# Patient Record
Sex: MALE | URBAN - METROPOLITAN AREA
[De-identification: names, ages, dates, MRNs, and addresses within clinical notes are randomized per-mention and may not be internally consistent; named-entity substitution may affect disease eponyms.]

---

## 2020-07-14 ENCOUNTER — OFFICE VISIT (OUTPATIENT)
Dept: URBAN - METROPOLITAN AREA CLINIC 17 | Facility: CLINIC | Age: 85
End: 2020-07-14

## 2020-07-14 RX ORDER — TAMSULOSIN HYDROCHLORIDE 0.4 MG/1
CAPSULE ORAL
Qty: 0 | Refills: 0 | COMMUNITY
Start: 1900-01-01

## 2020-07-14 RX ORDER — ACETAMINOPHEN ER 650 MG TABLET,EXTENDED RELEASE 650 MG
TABLET, EXTENDED RELEASE ORAL
Qty: 0 | Refills: 0 | COMMUNITY
Start: 1900-01-01

## 2020-07-14 RX ORDER — PANTOPRAZOLE SODIUM 40 MG/1
TAKE 1 TABLET (40 MG) BY ORAL ROUTE ONCE DAILY FOR 90 DAYS TABLET, DELAYED RELEASE ORAL 1
Qty: 90 | Refills: 3 | COMMUNITY
Start: 2020-01-31 | End: 2021-01-01

## 2020-08-19 ENCOUNTER — LAB OUTSIDE AN ENCOUNTER (OUTPATIENT)
Dept: URBAN - METROPOLITAN AREA TELEHEALTH 2 | Facility: TELEHEALTH | Age: 85
End: 2020-08-19

## 2020-08-19 ENCOUNTER — OFFICE VISIT (OUTPATIENT)
Dept: URBAN - METROPOLITAN AREA TELEHEALTH 2 | Facility: TELEHEALTH | Age: 85
End: 2020-08-19
Payer: MEDICARE

## 2020-08-19 DIAGNOSIS — K21.0 GASTROESOPHAGEAL REFLUX DISEASE WITH ESOPHAGITIS: ICD-10-CM

## 2020-08-19 DIAGNOSIS — K22.5 ZENKER DIVERTICULUM: ICD-10-CM

## 2020-08-19 DIAGNOSIS — K92.1 GASTROINTESTINAL HEMORRHAGE WITH MELENA: ICD-10-CM

## 2020-08-19 DIAGNOSIS — K57.30 COLON, DIVERTICULOSIS: ICD-10-CM

## 2020-08-19 PROCEDURE — 1036F TOBACCO NON-USER: CPT | Performed by: INTERNAL MEDICINE

## 2020-08-19 PROCEDURE — 99214 OFFICE O/P EST MOD 30 MIN: CPT | Performed by: INTERNAL MEDICINE

## 2020-08-19 PROCEDURE — G8420 CALC BMI NORM PARAMETERS: HCPCS | Performed by: INTERNAL MEDICINE

## 2020-08-19 PROCEDURE — G8427 DOCREV CUR MEDS BY ELIG CLIN: HCPCS | Performed by: INTERNAL MEDICINE

## 2020-08-19 PROCEDURE — G9903 PT SCRN TBCO ID AS NON USER: HCPCS | Performed by: INTERNAL MEDICINE

## 2020-08-19 RX ORDER — ACETAMINOPHEN ER 650 MG TABLET,EXTENDED RELEASE 650 MG
TABLET, EXTENDED RELEASE ORAL
Qty: 0 | Refills: 0 | COMMUNITY
Start: 1900-01-01

## 2020-08-19 RX ORDER — TAMSULOSIN HYDROCHLORIDE 0.4 MG/1
CAPSULE ORAL
Qty: 0 | Refills: 0 | COMMUNITY
Start: 1900-01-01

## 2020-08-19 RX ORDER — PANTOPRAZOLE SODIUM 40 MG/1
TAKE 1 TABLET (40 MG) BY ORAL ROUTE ONCE DAILY FOR 90 DAYS TABLET, DELAYED RELEASE ORAL 1
Qty: 90 | Refills: 3 | COMMUNITY
Start: 2020-01-31 | End: 2021-01-01

## 2020-08-19 NOTE — HPI-OTHER HISTORIES
89 yo male who is here with his partner. Pt of DR Micah Whyte. He has been taking pantoprazole 40mg daily and has not had any sour regurgitation or reflux.  No blood in stool. Has regular BMs daily with a stool softener. he did try Dexilant but did not notice any remarkable difference from Pantoprazole. He is more dietarily compliant. 1/31/20 here with partner. Now pt of Dr Shelton Mccracken. Sitting in a wheelchair. Has had a lot of falls "I didnt break anything". Has lost some weight "Hes not eating a lot". No abdominal pain. Has regular BMs. Acid reflux controlled on daily PPI.  4/6/20 Called by pt's caregiver 4 days ago stating pt had a "big blowout" of dark liquid stool overnight. HE stated pt had diarrhea preceeding that and ascribed that to the change in the color of his pantoprazole. Apparently, after a med refill, the pill had been changed from white to brown. So pt had not taken PPI for one week. We went over multiple scenarios including ddx PUD vs diverticular bleed. Pt was very clear the he did not want to end up in the hospital under any circumstances. THe plan was to continue PPI, increase to bid and watchful waiting. Today he says stool is formed. no diarrhea. no abdominal pain. pt is not "any paler than usual". Some weakness. appetite is good. no vomiting. Stool is dark with a dark red tinge to it now. Still does not want to go to the hospital.  4/8/20 "I feel almost back to normal". Says no more blood in stool. He is now on integra daily. Stools are firm and dark "because of the iron". NO abdominal pain "his color is better" says his caregiver.  5/6/20 "im doing good". No more blood in stool. no more abdominal pain. Has daily BMs, and tolerates daily iron well.  8/19/20  Still taking iron pills.  Taking it daily. No more abdominal pain. Takes Omeprazole daily.

## 2020-09-01 ENCOUNTER — LAB OUTSIDE AN ENCOUNTER (OUTPATIENT)
Dept: URBAN - METROPOLITAN AREA CLINIC 105 | Facility: CLINIC | Age: 85
End: 2020-09-01

## 2020-09-02 LAB
HEMATOCRIT: 35.4
HEMOGLOBIN: 12
MCH: 29
MCHC: 33.9
MCV: 86
NRBC: (no result)
PLATELETS: 265
RBC: 4.14
RDW: 17.8
WBC: 7

## 2020-10-01 ENCOUNTER — OFFICE VISIT (OUTPATIENT)
Dept: URBAN - METROPOLITAN AREA TELEHEALTH 2 | Facility: TELEHEALTH | Age: 85
End: 2020-10-01
Payer: MEDICARE

## 2020-10-01 DIAGNOSIS — K27.9 PUD (PEPTIC ULCER DISEASE): ICD-10-CM

## 2020-10-01 DIAGNOSIS — K21.01 GASTRO-ESOPHAGEAL REFLUX DISEASE WITH ESOPHAGITIS, WITH BLEEDING: ICD-10-CM

## 2020-10-01 DIAGNOSIS — R63.4 WEIGHT LOSS: ICD-10-CM

## 2020-10-01 DIAGNOSIS — K22.5 ZENKER DIVERTICULUM: ICD-10-CM

## 2020-10-01 DIAGNOSIS — M54.5 LOW BACK PAIN: ICD-10-CM

## 2020-10-01 DIAGNOSIS — G89.29 OTHER CHRONIC PAIN: ICD-10-CM

## 2020-10-01 DIAGNOSIS — K57.30 COLON, DIVERTICULOSIS: ICD-10-CM

## 2020-10-01 DIAGNOSIS — K92.1 GASTROINTESTINAL HEMORRHAGE WITH MELENA: ICD-10-CM

## 2020-10-01 DIAGNOSIS — D50.8 OTHER IRON DEFICIENCY ANEMIA: ICD-10-CM

## 2020-10-01 PROCEDURE — G8427 DOCREV CUR MEDS BY ELIG CLIN: HCPCS | Performed by: INTERNAL MEDICINE

## 2020-10-01 PROCEDURE — 99213 OFFICE O/P EST LOW 20 MIN: CPT | Performed by: INTERNAL MEDICINE

## 2020-10-01 PROCEDURE — G8482 FLU IMMUNIZE ORDER/ADMIN: HCPCS | Performed by: INTERNAL MEDICINE

## 2020-10-01 PROCEDURE — G8418 CALC BMI BLW LOW PARAM F/U: HCPCS | Performed by: INTERNAL MEDICINE

## 2020-10-01 RX ORDER — ACETAMINOPHEN ER 650 MG TABLET,EXTENDED RELEASE 650 MG
TABLET, EXTENDED RELEASE ORAL
Qty: 0 | Refills: 0 | Status: ACTIVE | COMMUNITY
Start: 1900-01-01

## 2020-10-01 RX ORDER — TAMSULOSIN HYDROCHLORIDE 0.4 MG/1
CAPSULE ORAL
Qty: 0 | Refills: 0 | Status: ACTIVE | COMMUNITY
Start: 1900-01-01

## 2020-10-01 RX ORDER — PANTOPRAZOLE SODIUM 40 MG/1
TAKE 1 TABLET (40 MG) BY ORAL ROUTE ONCE DAILY FOR 90 DAYS TABLET, DELAYED RELEASE ORAL 1
Qty: 90 | Refills: 3 | Status: ACTIVE | COMMUNITY
Start: 2020-01-31 | End: 2021-01-01

## 2020-10-01 NOTE — HPI-OTHER HISTORIES
89 yo male who is here with his partner. Pt of DR Micah Whyte. He has been taking pantoprazole 40mg daily and has not had any sour regurgitation or reflux.  No blood in stool. Has regular BMs daily with a stool softener. he did try Dexilant but did not notice any remarkable difference from Pantoprazole. He is more dietarily compliant. 1/31/20 here with partner. Now pt of Dr Shelton Mccracken. Sitting in a wheelchair. Has had a lot of falls "I didnt break anything". Has lost some weight "Hes not eating a lot". No abdominal pain. Has regular BMs. Acid reflux controlled on daily PPI.  4/6/20 Called by pt's caregiver 4 days ago stating pt had a "big blowout" of dark liquid stool overnight. HE stated pt had diarrhea preceeding that and ascribed that to the change in the color of his pantoprazole. Apparently, after a med refill, the pill had been changed from white to brown. So pt had not taken PPI for one week. We went over multiple scenarios including ddx PUD vs diverticular bleed. Pt was very clear the he did not want to end up in the hospital under any circumstances. THe plan was to continue PPI, increase to bid and watchful waiting. Today he says stool is formed. no diarrhea. no abdominal pain. pt is not "any paler than usual". Some weakness. appetite is good. no vomiting. Stool is dark with a dark red tinge to it now. Still does not want to go to the hospital.  4/8/20 "I feel almost back to normal". Says no more blood in stool. He is now on integra daily. Stools are firm and dark "because of the iron". NO abdominal pain "his color is better" says his caregiver.  5/6/20 "im doing good". No more blood in stool. no more abdominal pain. Has daily BMs, and tolerates daily iron well.  8/19/20  Still taking iron pills.  Taking it daily. No more abdominal pain. Takes Omeprazole daily.   10/1/20 Having regular BMs. Takes iron after breakfast and PPI prior to Having regular stools Discussed Hb is going up slowly, but not wnl yet. He wants to know if it is ok to have an epidural spinal injection. "His back has really been bothering him". I said no GI contraindications as long as he remains on his PPI.

## 2021-01-01 ENCOUNTER — OFFICE VISIT (OUTPATIENT)
Dept: URBAN - METROPOLITAN AREA CLINIC 105 | Facility: CLINIC | Age: 86
End: 2021-01-01

## 2021-01-01 ENCOUNTER — LAB OUTSIDE AN ENCOUNTER (OUTPATIENT)
Dept: URBAN - METROPOLITAN AREA CLINIC 105 | Facility: CLINIC | Age: 86
End: 2021-01-01

## 2021-01-01 ENCOUNTER — OFFICE VISIT (OUTPATIENT)
Dept: URBAN - METROPOLITAN AREA CLINIC 105 | Facility: CLINIC | Age: 86
End: 2021-01-01
Payer: MEDICARE

## 2021-01-01 ENCOUNTER — TELEPHONE ENCOUNTER (OUTPATIENT)
Dept: URBAN - METROPOLITAN AREA CLINIC 105 | Facility: CLINIC | Age: 86
End: 2021-01-01

## 2021-01-01 VITALS
SYSTOLIC BLOOD PRESSURE: 117 MMHG | WEIGHT: 121.4 LBS | HEART RATE: 62 BPM | BODY MASS INDEX: 15.58 KG/M2 | TEMPERATURE: 97 F | DIASTOLIC BLOOD PRESSURE: 78 MMHG | HEIGHT: 74 IN

## 2021-01-01 DIAGNOSIS — K22.5 ZENKER DIVERTICULUM: ICD-10-CM

## 2021-01-01 DIAGNOSIS — K92.1 GASTROINTESTINAL HEMORRHAGE WITH MELENA: ICD-10-CM

## 2021-01-01 DIAGNOSIS — D50.0 NORMOCYTIC ANEMIA DUE TO BLOOD LOSS: ICD-10-CM

## 2021-01-01 DIAGNOSIS — K27.9 PUD (PEPTIC ULCER DISEASE): ICD-10-CM

## 2021-01-01 DIAGNOSIS — D50.8 OTHER IRON DEFICIENCY ANEMIAS: ICD-10-CM

## 2021-01-01 DIAGNOSIS — N18.32 STAGE 3B CHRONIC KIDNEY DISEASE: ICD-10-CM

## 2021-01-01 DIAGNOSIS — K57.30 COLON, DIVERTICULOSIS: ICD-10-CM

## 2021-01-01 DIAGNOSIS — G89.29 OTHER CHRONIC PAIN: ICD-10-CM

## 2021-01-01 DIAGNOSIS — R63.4 WEIGHT LOSS: ICD-10-CM

## 2021-01-01 DIAGNOSIS — K21.00 ALKALINE REFLUX ESOPHAGITIS: ICD-10-CM

## 2021-01-01 DIAGNOSIS — K59.09 CHRONIC CONSTIPATION: ICD-10-CM

## 2021-01-01 DIAGNOSIS — K21.00 BILE REFLUX ESOPHAGITIS: ICD-10-CM

## 2021-01-01 DIAGNOSIS — R54 ADVANCED AGE: ICD-10-CM

## 2021-01-01 DIAGNOSIS — M54.5 LOW BACK PAIN: ICD-10-CM

## 2021-01-01 PROCEDURE — 99213 OFFICE O/P EST LOW 20 MIN: CPT | Performed by: INTERNAL MEDICINE

## 2021-01-01 RX ORDER — PANTOPRAZOLE SODIUM 40 MG/1
TAKE 1 TABLET (40 MG) BY ORAL ROUTE ONCE DAILY FOR 90 DAYS TABLET, DELAYED RELEASE ORAL ONCE A DAY
Qty: 90 | Refills: 3 | Status: ACTIVE | COMMUNITY
Start: 2020-01-31

## 2021-01-01 RX ORDER — PANTOPRAZOLE SODIUM 40 MG/1
TAKE 1 TABLET (40 MG) BY ORAL ROUTE ONCE DAILY FOR 90 DAYS TABLET, DELAYED RELEASE ORAL ONCE A DAY
Qty: 90 | Refills: 3
Start: 2020-01-31

## 2021-01-01 RX ORDER — MEGESTROL ACETATE 40 MG/1
1 TABLET TABLET ORAL TWICE A DAY
Qty: 180 TABLET | Refills: 0 | OUTPATIENT
Start: 2021-01-01 | End: 2022-03-08

## 2021-01-01 RX ORDER — ACETAMINOPHEN ER 650 MG TABLET,EXTENDED RELEASE 650 MG
TABLET, EXTENDED RELEASE ORAL
Qty: 0 | Refills: 0 | Status: ACTIVE | COMMUNITY
Start: 1900-01-01

## 2021-01-01 RX ORDER — IRON FUM,PS CMP/VIT C/NIACIN 125-40-3MG
1 CAPSULE BETWEEN MEALS CAPSULE ORAL ONCE A DAY
Qty: 90 | Refills: 3 | OUTPATIENT
Start: 2021-01-01

## 2021-01-01 RX ORDER — IRON FUM,PS CMP/VIT C/NIACIN 125-40-3MG
1 CAPSULE BETWEEN MEALS CAPSULE ORAL ONCE A DAY
Qty: 90 | Refills: 3
Start: 2021-01-01

## 2021-01-01 RX ORDER — TAMSULOSIN HYDROCHLORIDE 0.4 MG/1
CAPSULE ORAL
Qty: 0 | Refills: 0 | Status: ACTIVE | COMMUNITY
Start: 1900-01-01

## 2021-03-15 NOTE — EXAM-PHYSICAL EXAM
Gen: Alert, oriented, in no distress Heent: no icterus, lips wnl Lungs: bilateral breath sounds CVS: first and second heart sounds Abdomen: full, soft, non tender, sitting in a wheelchair Ext: no edema Joints: normal joints and symmetry Spine: consistent with age Skin: no rash on exposed areas Neuro: no focal localizing signs

## 2021-03-15 NOTE — HPI-OTHER HISTORIES
89 yo male who is here with his partner. Pt of DR Micah Whyte. He has been taking pantoprazole 40mg daily and has not had any sour regurgitation or reflux.  No blood in stool. Has regular BMs daily with a stool softener. he did try Dexilant but did not notice any remarkable difference from Pantoprazole. He is more dietarily compliant. 1/31/20 here with partner. Now pt of Dr Shelton Mccracken. Sitting in a wheelchair. Has had a lot of falls "I didnt break anything". Has lost some weight "Hes not eating a lot". No abdominal pain. Has regular BMs. Acid reflux controlled on daily PPI.  4/6/20 Called by pt's caregiver 4 days ago stating pt had a "big blowout" of dark liquid stool overnight. HE stated pt had diarrhea preceeding that and ascribed that to the change in the color of his pantoprazole. Apparently, after a med refill, the pill had been changed from white to brown. So pt had not taken PPI for one week. We went over multiple scenarios including ddx PUD vs diverticular bleed. Pt was very clear the he did not want to end up in the hospital under any circumstances. THe plan was to continue PPI, increase to bid and watchful waiting. Today he says stool is formed. no diarrhea. no abdominal pain. pt is not "any paler than usual". Some weakness. appetite is good. no vomiting. Stool is dark with a dark red tinge to it now. Still does not want to go to the hospital.  4/8/20 "I feel almost back to normal". Says no more blood in stool. He is now on integra daily. Stools are firm and dark "because of the iron". NO abdominal pain "his color is better" says his caregiver.  5/6/20 "im doing good". No more blood in stool. no more abdominal pain. Has daily BMs, and tolerates daily iron well.  8/19/20  Still taking iron pills.  Taking it daily. No more abdominal pain. Takes Omeprazole daily.   10/1/20 Having regular BMs. Takes iron after breakfast and PPI prior to Having regular stools Discussed Hb is going up slowly, but not wnl yet. He wants to know if it is ok to have an epidural spinal injection. "His back has really been bothering him". I said no GI contraindications as long as he remains on his PPI.   3/15/21 doing well. Here with caregiver. Still taking daily INtegra. Takes a stool softener daily, has a BM daily but difficult to evacuate.  No blood in stool/ takes PPI daily

## 2021-12-08 PROBLEM — 82423001: Status: ACTIVE | Noted: 2020-10-01

## 2021-12-08 PROBLEM — 399291005: Status: ACTIVE | Noted: 2020-08-04

## 2021-12-08 PROBLEM — 266433003: Status: ACTIVE | Noted: 2020-08-04

## 2021-12-08 PROBLEM — 49808004: Status: ACTIVE | Noted: 2020-08-04

## 2021-12-08 PROBLEM — 414663001: Status: ACTIVE | Noted: 2020-08-19

## 2021-12-08 PROBLEM — 413532003: Status: ACTIVE | Noted: 2020-10-01

## 2021-12-08 PROBLEM — 13200003: Status: ACTIVE | Noted: 2020-08-04

## 2021-12-08 PROBLEM — 733657002: Status: ACTIVE | Noted: 2020-08-04

## 2021-12-08 PROBLEM — 700379002: Status: ACTIVE | Noted: 2021-01-01

## 2021-12-08 PROBLEM — 89362005: Status: ACTIVE | Noted: 2020-08-04

## 2021-12-08 NOTE — EXAM-PHYSICAL EXAM
Gen: Alert, oriented, in no distress Heent: no icterus, lips wnl Lungs: bilateral breath sounds CVS: first and second heart sounds Abdomen: full, soft, non tender Ext: no edema Joints: sitting in a wheelchair Spine: consistent with age Skin: no rash on exposed areas Neuro: no focal localizing signs

## 2021-12-08 NOTE — HPI-OTHER HISTORIES
89 yo male who is here with his partner. Pt of DR Micah Whyte. He has been taking pantoprazole 40mg daily and has not had any sour regurgitation or reflux.  No blood in stool. Has regular BMs daily with a stool softener. he did try Dexilant but did not notice any remarkable difference from Pantoprazole. He is more dietarily compliant. 1/31/20 here with partner. Now pt of Dr Shelton Mccracken. Sitting in a wheelchair. Has had a lot of falls "I didnt break anything". Has lost some weight "Hes not eating a lot". No abdominal pain. Has regular BMs. Acid reflux controlled on daily PPI.  4/6/20 Called by pt's caregiver 4 days ago stating pt had a "big blowout" of dark liquid stool overnight. HE stated pt had diarrhea preceeding that and ascribed that to the change in the color of his pantoprazole. Apparently, after a med refill, the pill had been changed from white to brown. So pt had not taken PPI for one week. We went over multiple scenarios including ddx PUD vs diverticular bleed. Pt was very clear the he did not want to end up in the hospital under any circumstances. THe plan was to continue PPI, increase to bid and watchful waiting. Today he says stool is formed. no diarrhea. no abdominal pain. pt is not "any paler than usual". Some weakness. appetite is good. no vomiting. Stool is dark with a dark red tinge to it now. Still does not want to go to the hospital.  4/8/20 "I feel almost back to normal". Says no more blood in stool. He is now on integra daily. Stools are firm and dark "because of the iron". NO abdominal pain "his color is better" says his caregiver.  5/6/20 "im doing good". No more blood in stool. no more abdominal pain. Has daily BMs, and tolerates daily iron well.  8/19/20  Still taking iron pills.  Taking it daily. No more abdominal pain. Takes Omeprazole daily.   10/1/20 Having regular BMs. Takes iron after breakfast and PPI prior to Having regular stools Discussed Hb is going up slowly, but not wnl yet. He wants to know if it is ok to have an epidural spinal injection. "His back has really been bothering him". I said no GI contraindications as long as he remains on his PPI.   3/15/21 doing well. Here with caregiver. Still taking daily INtegra. Takes a stool softener daily, has a BM daily but difficult to evacuate.  No blood in stool/ takes PPI daily   12/8/21 Still taking PPI daily.Here with caregiver. Stopped Integra months ago. Takes a stool softener and has regular 'BMs.  Had blood work with PCP last week - I  reviewed labs from 12/2/21 showing hb 11.9 ,mcv 98.  I note he has last 14 lbs since his last visit. Has not been eating enough.

## 2022-03-01 ENCOUNTER — DASHBOARD ENCOUNTERS (OUTPATIENT)
Age: 87
End: 2022-03-01

## 2022-03-07 ENCOUNTER — OFFICE VISIT (OUTPATIENT)
Dept: URBAN - METROPOLITAN AREA CLINIC 105 | Facility: CLINIC | Age: 87
End: 2022-03-07